# Patient Record
Sex: FEMALE | Race: ASIAN | NOT HISPANIC OR LATINO | URBAN - METROPOLITAN AREA
[De-identification: names, ages, dates, MRNs, and addresses within clinical notes are randomized per-mention and may not be internally consistent; named-entity substitution may affect disease eponyms.]

---

## 2019-01-12 ENCOUNTER — EMERGENCY (EMERGENCY)
Facility: HOSPITAL | Age: 29
LOS: 1 days | Discharge: HOME | End: 2019-01-12
Attending: EMERGENCY MEDICINE
Payer: MEDICAID

## 2019-01-12 VITALS
HEIGHT: 63 IN | WEIGHT: 123.9 LBS | OXYGEN SATURATION: 100 % | TEMPERATURE: 98 F | DIASTOLIC BLOOD PRESSURE: 77 MMHG | SYSTOLIC BLOOD PRESSURE: 136 MMHG | HEART RATE: 82 BPM | RESPIRATION RATE: 17 BRPM

## 2019-01-12 PROCEDURE — 99282 EMERGENCY DEPT VISIT SF MDM: CPT

## 2019-01-12 PROCEDURE — 99282 EMERGENCY DEPT VISIT SF MDM: CPT | Mod: 25

## 2019-01-12 NOTE — ED PROVIDER NOTE - CARE PLAN
Principal Discharge DX:	General medical examination  Goal:	Bilateral klein discoloration Principal Discharge DX:	Skin change  Goal:	Bilateral klein discoloration

## 2019-01-12 NOTE — ED PROVIDER NOTE - OBJECTIVE STATEMENT
29 yo  female 18 weeks pregnant, p/w b/l staining of her palms when she woke up this AM. Pt reports she was making Jerky with her  last night. 27 yo  female 18 weeks pregnant, p/w brown discoloration of her b/l palms when she woke up this AM. Pt reports she was making Jerky with her  last night and states that is a possible source of skin discoloration of her palms. Discoloration does not extend past klein wrist, spares soles, no rash or discoloration in skin anywhere else. Pt's 's palms only mildly discolored. Pt denies new diet, skin changes anywhere else, CP, SOB, abdominal pain, urinary symptoms, nausea, vomiting, diarrhea.

## 2019-01-12 NOTE — ED PROVIDER NOTE - PHYSICAL EXAMINATION
General: Patient awake alert NAD.   HEENT: normocephalic, atraumatic, EMOI, neck supple, no JVD  Cardiac: RRR, S1, S2, no murmur, rubs, gallop.   LUNGS: CTA B/L no wheeze, rhonchi, rales.   Abdomen: gravid abdomen, soft NT, ND, BS, no rebound no guarding, no CVA tenderness.   EXT: strength and ROM at patient's baseline, no edema, no calf tenderness,   Neuro: A&Ox3 gait normal, no focal neurological deficits, CN 2-12 grossly intact  Skin: + brown discoloration in bilateral palms, no discoloration in bilateral soles, warm, dry, no rash, no lesions

## 2019-01-12 NOTE — ED PROVIDER NOTE - ATTENDING CONTRIBUTION TO CARE
Attending MD Arredondo: I personally have seen and examined this patient.  Resident note reviewed and agree on plan of care and except where noted.  See below for details.     Seen in RH 0.5    28F with PMH including anemia presents to the ED with palmar discoloration.  Reports yesterday she and her  were handling seasoned red meat (seasoned with red chili) and today she has bilateral palmar discoloration.   has similar discoloration on dominant (right) hand.  Denies new environmental exposure.  Denies pruritic.  Denies other rash.  Denies chest pain, shortness of breath, palpitations. Denies abdominal pain, nausea, vomiting, diarrhea, blood in stools. Denies dysuria, hematuria, change in urinary habits including frequency, urgency. Denies fevers, chills.  Denies recent travel.  LMP 18, PRIYANKA 19wk0d by dates.  , spontaneous  in May 2018.  Reports that is the reason that they came in because concerned after miscarriage.  On exam, NAD, head NCAT, PERRL, FROM at neck, no tenderness to midline palpation, no stepoffs along length of spine, lungs CTAB with good inspiratory effort, +S1S2, no m/r/g, abdomen soft with +BS, NT, gravid abdomen, no CVAT, moving all extremities with 5/5 strength bilateral upper and lower extremities, good and equal  strength bilaterally, no rash noted, +yellowish discoloration to palmar aspect only of hands (fingers with clear demarcation stained/nonstained), same on  able to remove some yellow pigment from hands of both patient and , no rash elsewhere, soles clear; A/P: 28F with palmar discoloration, suspect contact.  Reassured.  Stable for discharge. Follow up instructions given, importance of follow up emphasized, return to ED parameters reviewed and patient verbalized understanding.  All questions answered, all concerns addressed.

## 2019-01-12 NOTE — ED PROVIDER NOTE - NS ED ROS FT
GENERAL: No fever, chills  EYES: no vision changes, no discharge.   HEENT: no difficulty  swallowing or speaking   CARDIAC: no chest pain/pressure, SOB, lower ex edema  PULMONARY: no cough, SOB  GI: no abdominal pain, n/v/d/c  : no dysuria, frequency, hematuria  SKIN: + brown discoloration of b/l palms. no rashes, lesions, vesicles anywhere else.   NEURO: no headache, lightheadedness, paraesthesias.   MSK: No joint pain, myalgia, weakness.

## 2019-01-12 NOTE — ED ADULT TRIAGE NOTE - CHIEF COMPLAINT QUOTE
Patient c/o bilateral hand palms yellowish since this am. Patient is 18 weeks pregnant. History of anemia.

## 2019-01-12 NOTE — ED ADULT NURSE NOTE - OBJECTIVE STATEMENT
27 y/o female a+ox3,  currently 18 weeks pregnant, denies pmhx, coming from home for "yellow hands" bilaterally x 1 day. Pt noticed the palms of her hands "looking yellow" bilaterally earlier today; pt felt slight right sided intermittent abdominal pain; currently resolved.  states they were "hanging meat to make jerky" within last 24 hours. Pt denies being high risk pregnancy, chest pain or discomfort, headache, lightheadedness, n/v/d, melena, hematuria, fevers or chills. Pt states abdominal pain is "normal"; denies recent travel or sick contacts. Pt left in position of comfort, will reassess

## 2019-01-13 VITALS
DIASTOLIC BLOOD PRESSURE: 66 MMHG | RESPIRATION RATE: 18 BRPM | HEART RATE: 70 BPM | OXYGEN SATURATION: 99 % | TEMPERATURE: 98 F | SYSTOLIC BLOOD PRESSURE: 101 MMHG

## 2019-04-14 ENCOUNTER — TRANSCRIPTION ENCOUNTER (OUTPATIENT)
Age: 29
End: 2019-04-14

## 2020-02-24 ENCOUNTER — OUTPATIENT (OUTPATIENT)
Dept: INPATIENT UNIT | Facility: HOSPITAL | Age: 30
LOS: 1 days | Discharge: HOME | End: 2020-02-24
Payer: MEDICAID

## 2020-02-24 ENCOUNTER — EMERGENCY (EMERGENCY)
Facility: HOSPITAL | Age: 30
LOS: 1 days | Discharge: STILL ADMITTED OR ADMINISTRATIVE | End: 2020-02-24
Admitting: EMERGENCY MEDICINE

## 2020-02-24 VITALS
TEMPERATURE: 98 F | RESPIRATION RATE: 16 BRPM | OXYGEN SATURATION: 100 % | DIASTOLIC BLOOD PRESSURE: 70 MMHG | HEART RATE: 78 BPM | SYSTOLIC BLOOD PRESSURE: 106 MMHG

## 2020-02-24 DIAGNOSIS — Z3A.00 WEEKS OF GESTATION OF PREGNANCY NOT SPECIFIED: ICD-10-CM

## 2020-02-24 DIAGNOSIS — O26.899 OTHER SPECIFIED PREGNANCY RELATED CONDITIONS, UNSPECIFIED TRIMESTER: ICD-10-CM

## 2020-02-24 PROCEDURE — 76830 TRANSVAGINAL US NON-OB: CPT | Mod: 26

## 2020-02-24 PROCEDURE — 76818 FETAL BIOPHYS PROFILE W/NST: CPT | Mod: 26

## 2020-02-24 PROCEDURE — 99215 OFFICE O/P EST HI 40 MIN: CPT

## 2020-02-24 NOTE — ED ADULT NURSE NOTE - CHIEF COMPLAINT QUOTE
c/o episode of vaginal bleeding after intercourse. also intermittent pelvic cramping. approximately 25 weeks pregnant. normal pregnancy so far. due date 6/7. unsure if still bleeding, states believes bleeding has stopped.

## 2020-02-24 NOTE — ED ADULT TRIAGE NOTE - CHIEF COMPLAINT QUOTE
c/o episode of vaginal bleeding after intercourse. also intermittent pelvic cramping. approximately 25 weeks pregnant. normal pregnancy so far. due date 6/7. unsure if still bleeding. c/o episode of vaginal bleeding after intercourse. also intermittent pelvic cramping. approximately 25 weeks pregnant. normal pregnancy so far. due date 6/7. unsure if still bleeding, states believes bleeding has stopped.

## 2020-02-25 VITALS
SYSTOLIC BLOOD PRESSURE: 107 MMHG | DIASTOLIC BLOOD PRESSURE: 54 MMHG | TEMPERATURE: 98 F | RESPIRATION RATE: 16 BRPM | HEART RATE: 79 BPM

## 2020-02-25 VITALS — DIASTOLIC BLOOD PRESSURE: 51 MMHG | SYSTOLIC BLOOD PRESSURE: 94 MMHG | HEART RATE: 72 BPM

## 2020-02-25 LAB
APPEARANCE UR: CLEAR — SIGNIFICANT CHANGE UP
BACTERIA # UR AUTO: NEGATIVE — SIGNIFICANT CHANGE UP
BILIRUB UR-MCNC: NEGATIVE — SIGNIFICANT CHANGE UP
BLD GP AB SCN SERPL QL: NEGATIVE — SIGNIFICANT CHANGE UP
BLOOD UR QL VISUAL: SIGNIFICANT CHANGE UP
COLOR SPEC: SIGNIFICANT CHANGE UP
FIBRINOGEN PPP-MCNC: 560 MG/DL — HIGH (ref 350–510)
GLUCOSE UR-MCNC: NEGATIVE — SIGNIFICANT CHANGE UP
HCT VFR BLD CALC: 24.1 % — LOW (ref 34.5–45)
HGB BLD-MCNC: 7.5 G/DL — LOW (ref 11.5–15.5)
HYALINE CASTS # UR AUTO: NEGATIVE — SIGNIFICANT CHANGE UP
KETONES UR-MCNC: NEGATIVE — SIGNIFICANT CHANGE UP
LEUKOCYTE ESTERASE UR-ACNC: NEGATIVE — SIGNIFICANT CHANGE UP
MCHC RBC-ENTMCNC: 22.1 PG — LOW (ref 27–34)
MCHC RBC-ENTMCNC: 31.1 % — LOW (ref 32–36)
MCV RBC AUTO: 70.9 FL — LOW (ref 80–100)
NITRITE UR-MCNC: NEGATIVE — SIGNIFICANT CHANGE UP
NRBC # FLD: 0.05 K/UL — SIGNIFICANT CHANGE UP (ref 0–0)
PH UR: 7 — SIGNIFICANT CHANGE UP (ref 5–8)
PLATELET # BLD AUTO: 359 K/UL — SIGNIFICANT CHANGE UP (ref 150–400)
PMV BLD: 10.5 FL — SIGNIFICANT CHANGE UP (ref 7–13)
PROT UR-MCNC: NEGATIVE — SIGNIFICANT CHANGE UP
RBC # BLD: 3.4 M/UL — LOW (ref 3.8–5.2)
RBC # FLD: 18.4 % — HIGH (ref 10.3–14.5)
RBC CASTS # UR COMP ASSIST: SIGNIFICANT CHANGE UP (ref 0–?)
RH IG SCN BLD-IMP: POSITIVE — SIGNIFICANT CHANGE UP
SP GR SPEC: 1.01 — SIGNIFICANT CHANGE UP (ref 1–1.04)
SQUAMOUS # UR AUTO: SIGNIFICANT CHANGE UP
UROBILINOGEN FLD QL: NORMAL — SIGNIFICANT CHANGE UP
WBC # BLD: 10.2 K/UL — SIGNIFICANT CHANGE UP (ref 3.8–10.5)
WBC # FLD AUTO: 10.2 K/UL — SIGNIFICANT CHANGE UP (ref 3.8–10.5)
WBC UR QL: SIGNIFICANT CHANGE UP (ref 0–?)

## 2020-02-25 NOTE — OB PROVIDER TRIAGE NOTE - NSHPPHYSICALEXAM_GEN_ALL_CORE
BP: 107/54, HR: 79, Temp: 36.9  Abdomen soft nontender  TAS: Cephalic presentation, posterior placenta, MVP:7.72, BPP:8/8  Speulum: Cervix appears closed. Small amount of streaks of blood noted at cervical os but no active bleeding  TVS: CL:3.4-3.87 no funneling or dynamic changes

## 2020-02-25 NOTE — OB RN TRIAGE NOTE - PMH
Blood transfusion affecting pregnancy  H/o blood transfusion in first pregnancy due to severe anemia  Miscarriage  X2 with no D&C   (normal spontaneous vaginal delivery)  2007 FT M 7#8  2019 FT M 6#5

## 2020-02-25 NOTE — OB PROVIDER TRIAGE NOTE - NSOBPROVIDERNOTE_OBGYN_ALL_OB_FT
Pt. is a 30y/o  EGA 25.2wks receives prenatal care from a private clinic in [place] associated with [facility]. Pt. states after having intercourse she noticed bright red blood on her inner thighs, perineum and partner's pelvic area . Pt. also reports of some mild abdominal cramping after intercourse. Pt. denies leakage of fluid. Pt. reports good fetal movement.     AP: Denies  Medical Hx: Received 1unit PRBC in  @8months pregnant for severe anemia  Surgical Hx: Denies  OBGYN Hx:    2010 7-8   2019 6-5  SABx2  and   Meds: PNV, Iron  NKDA    Assessment/Plan  BP: 107/54, HR: 79, Temp: 36.9  Abdomen soft nontender  TAS: Cephalic presentation, posterior placenta, MVP:7.72, BPP:8/8  Speulum: Cervix appears closed. Small amount of streaks of blood noted at cervical os but no active bleeding  TVS: CL: 3.4-3.87 no funneling or dynamic changes Pt. is a 28y/o  EGA 25.2wks receives prenatal care from a private clinic in [place] associated with [facility]. Pt. states after having intercourse she noticed bright red blood on her inner thighs, perineum and partner's pelvic area . Pt. also reports of some mild abdominal cramping after intercourse. Pt. denies leakage of fluid. Pt. reports good fetal movement.     AP: Denies  Blood Type: B Pos.                                          Medical Hx: Received 1unit PRBC in  @8months pregnant for severe anemia  Surgical Hx: Denies  OBGYN Hx:    2010 7-8   2019 6-5  SABx2  and   Meds: PNV, Iron  NKDA    Assessment/Plan  BP: 107/54, HR: 79, Temp: 36.9  Abdomen soft nontender  TAS: Cephalic presentation, posterior placenta, MVP:7.72, BPP:8/8  Speulum: Cervix appears closed. Small amount of streaks of blood noted at cervical os but no active bleeding  TVS: CL: 3.4-3.87 no funneling or dynamic changes                          7.5    10.20 )-----------( 359      ( 2020 00:30 )             24.1     Fibrinogen: 560    Urinalysis Basic - ( 2020 00:30 )    Color: LIGHT YELLOW / Appearance: CLEAR / S.007 / pH: 7.0  Gluc: NEGATIVE / Ketone: NEGATIVE  / Bili: NEGATIVE / Urobili: NORMAL   Blood: SMALL / Protein: NEGATIVE / Nitrite: NEGATIVE   Leuk Esterase: NEGATIVE / RBC: 3-5 / WBC 3-5   Sq Epi: OCC / Non Sq Epi: x / Bacteria: NEGATIVE Pt. is a 30y/o  EGA 25.2wks receives prenatal care from a private clinic in [place] associated with [facility]. Pt. states after having intercourse she noticed bright red blood on her inner thighs, perineum and partner's pelvic area . Pt. also reports of some mild abdominal cramping after intercourse. Pt. denies leakage of fluid. Pt. reports good fetal movement.     AP: Denies  Blood Type: B Pos.                                          Medical Hx: Received 1unit PRBC in  @8months pregnant for severe anemia  Surgical Hx: Denies  OBGYN Hx:    2010 7-8   2019 6-5  SABx2  and   Meds: PNV, Iron  NKDA    Assessment/Plan  BP: 107/54, HR: 79, Temp: 36.9  Abdomen soft nontender  TAS: Cephalic presentation, posterior placenta, MVP:7.72, BPP:8/8  Speulum: Cervix appears closed. Small amount of streaks of blood noted at cervical os but no active bleeding  TVS: CL: 3.4-3.87 no funneling or dynamic changes  FHR: 140bpm, moderate variability, accelerations, no decelerations  Satish irregularly                         7.5    10.20 )-----------( 359      ( 2020 00:30 )             24.1     Fibrinogen: 560    Urinalysis Basic - ( 2020 00:30 )    Color: LIGHT YELLOW / Appearance: CLEAR / S.007 / pH: 7.0  Gluc: NEGATIVE / Ketone: NEGATIVE  / Bili: NEGATIVE / Urobili: NORMAL   Blood: SMALL / Protein: NEGATIVE / Nitrite: NEGATIVE   Leuk Esterase: NEGATIVE / RBC: 3-5 / WBC 3-5   Sq Epi: OCC / Non Sq Epi: x / Bacteria: NEGATIVE Pt. is a 28y/o  EGA 25.2wks receives prenatal care from a private clinic in [place] associated with [facility]. Pt. states after having intercourse she noticed bright red blood on her inner thighs, perineum and partner's pelvic area . Pt. also reports of some mild abdominal cramping after intercourse. Pt. denies leakage of fluid. Pt. reports good fetal movement.     AP: Denies  Blood Type: B Pos.                                          Medical Hx: Received 1unit PRBC in  @8months pregnant for severe anemia  Surgical Hx: Denies  OBGYN Hx:    2010 7-8   2019 6-5  SABx2  and   Meds: PNV, Iron  NKDA    Assessment/Plan  BP: 107/54, HR: 79, Temp: 36.9  Abdomen soft nontender  TAS: Cephalic presentation, posterior placenta, MVP:7.72, BPP:8/8  Speulum: Cervix appears closed. Small amount of streaks of blood noted at cervical os but no active bleeding  TVS: CL: 3.4-3.87 no funneling or dynamic changes  FHR: 140bpm, moderate variability, accelerations, no decelerations  Satish irregularly                         7.5    10.20 )-----------( 359      ( 2020 00:30 )             24.1     Fibrinogen: 560    Urinalysis Basic - ( 2020 00:30 )    Color: LIGHT YELLOW / Appearance: CLEAR / S.007 / pH: 7.0  Gluc: NEGATIVE / Ketone: NEGATIVE  / Bili: NEGATIVE / Urobili: NORMAL   Blood: SMALL / Protein: NEGATIVE / Nitrite: NEGATIVE   Leuk Esterase: NEGATIVE / RBC: 3-5 / WBC 3-5   Sq Epi: OCC / Non Sq Epi: x / Bacteria: NEGATIVE    Fetal wellbeing reassured. Discussed findings with Dr. Mendez. Pt. d/c'd home. Pt. to follow up with next OB appointment. Pt. instructed to return to triage with increase abdominal contractions, leakage of fluid, vaginal bleeding or decrease fetal movement

## 2020-02-25 NOTE — OB PROVIDER TRIAGE NOTE - NSHPLABSRESULTS_GEN_ALL_CORE
7.5    10.20 )-----------( 359      ( 2020 00:30 )             24.1     Fibrinogen: 560    Urinalysis Basic - ( 2020 00:30 )    Color: LIGHT YELLOW / Appearance: CLEAR / S.007 / pH: 7.0  Gluc: NEGATIVE / Ketone: NEGATIVE  / Bili: NEGATIVE / Urobili: NORMAL   Blood: SMALL / Protein: NEGATIVE / Nitrite: NEGATIVE   Leuk Esterase: NEGATIVE / RBC: 3-5 / WBC 3-5   Sq Epi: OCC / Non Sq Epi: x / Bacteria: NEGATIVE

## 2020-02-25 NOTE — OB PROVIDER TRIAGE NOTE - HISTORY OF PRESENT ILLNESS
Pt. is a 28y/o  EGA 25.2wks receives prenatal care from a private clinic in [place] associated with [facility]. Pt. states after having intercourse she noticed bright red blood on her inner thighs, perineum and partner's pelvic area . Pt. also reports of some mild abdominal cramping after intercourse. Pt. denies leakage of fluid. Pt. reports good fetal movement.

## 2020-09-20 ENCOUNTER — EMERGENCY (EMERGENCY)
Facility: HOSPITAL | Age: 30
LOS: 1 days | Discharge: HOME | End: 2020-09-20
Attending: EMERGENCY MEDICINE | Admitting: EMERGENCY MEDICINE
Payer: MEDICAID

## 2020-09-20 VITALS
TEMPERATURE: 98 F | OXYGEN SATURATION: 99 % | RESPIRATION RATE: 16 BRPM | SYSTOLIC BLOOD PRESSURE: 117 MMHG | HEART RATE: 89 BPM | DIASTOLIC BLOOD PRESSURE: 70 MMHG | HEIGHT: 63 IN

## 2020-09-20 PROBLEM — O99.89 OTHER SPECIFIED DISEASES AND CONDITIONS COMPLICATING PREGNANCY, CHILDBIRTH AND THE PUERPERIUM: Chronic | Status: ACTIVE | Noted: 2020-02-25

## 2020-09-20 PROBLEM — O03.9 COMPLETE OR UNSPECIFIED SPONTANEOUS ABORTION WITHOUT COMPLICATION: Chronic | Status: ACTIVE | Noted: 2020-02-25

## 2020-09-20 PROCEDURE — 99284 EMERGENCY DEPT VISIT MOD MDM: CPT

## 2020-09-20 RX ORDER — AMOXICILLIN 250 MG/5ML
875 SUSPENSION, RECONSTITUTED, ORAL (ML) ORAL ONCE
Refills: 0 | Status: DISCONTINUED | OUTPATIENT
Start: 2020-09-20 | End: 2020-09-20

## 2020-09-20 RX ORDER — METOCLOPRAMIDE HCL 10 MG
10 TABLET ORAL ONCE
Refills: 0 | Status: COMPLETED | OUTPATIENT
Start: 2020-09-20 | End: 2020-09-20

## 2020-09-20 RX ORDER — KETOROLAC TROMETHAMINE 30 MG/ML
30 SYRINGE (ML) INJECTION ONCE
Refills: 0 | Status: DISCONTINUED | OUTPATIENT
Start: 2020-09-20 | End: 2020-09-20

## 2020-09-20 RX ORDER — AMOXICILLIN 250 MG/5ML
500 SUSPENSION, RECONSTITUTED, ORAL (ML) ORAL ONCE
Refills: 0 | Status: COMPLETED | OUTPATIENT
Start: 2020-09-20 | End: 2020-09-20

## 2020-09-20 RX ORDER — SODIUM CHLORIDE 9 MG/ML
1000 INJECTION INTRAMUSCULAR; INTRAVENOUS; SUBCUTANEOUS ONCE
Refills: 0 | Status: COMPLETED | OUTPATIENT
Start: 2020-09-20 | End: 2020-09-20

## 2020-09-20 RX ORDER — AMOXICILLIN 250 MG/5ML
1 SUSPENSION, RECONSTITUTED, ORAL (ML) ORAL
Qty: 20 | Refills: 0
Start: 2020-09-20 | End: 2020-09-29

## 2020-09-20 RX ORDER — AZTREONAM 2 G
1 VIAL (EA) INJECTION
Qty: 20 | Refills: 0
Start: 2020-09-20 | End: 2020-09-29

## 2020-09-20 RX ADMIN — Medication 10 MILLIGRAM(S): at 10:19

## 2020-09-20 RX ADMIN — Medication 500 MILLIGRAM(S): at 10:50

## 2020-09-20 RX ADMIN — Medication 1 TABLET(S): at 10:37

## 2020-09-20 RX ADMIN — SODIUM CHLORIDE 1000 MILLILITER(S): 9 INJECTION INTRAMUSCULAR; INTRAVENOUS; SUBCUTANEOUS at 10:19

## 2020-09-20 RX ADMIN — Medication 30 MILLIGRAM(S): at 10:19

## 2020-09-20 NOTE — ED PROVIDER NOTE - PROGRESS NOTE DETAILS
started pt on fluids and pain control. will reassess for sxs improvement Pt states her sxs have improved. explained that sxs can be due to septal cellulitis or possible migraine. pt explained to take abx as directed. will return if she has worsening fever, pain, vomiting, rash or other eye symptoms.

## 2020-09-20 NOTE — ED PROVIDER NOTE - PATIENT PORTAL LINK FT
You can access the FollowMyHealth Patient Portal offered by [facility] by registering at the following website: [email or web address]. By joining [employer]’s FollowMyHealth portal, you will also be able to view your health information using other applications (apps) compatible with our system.

## 2020-09-20 NOTE — ED PROVIDER NOTE - PHYSICAL EXAMINATION
Vital signs reviewed  GENERAL: Patient nontoxic appearing, NAD. ambulating   HEAD: NCAT  EYES: Anicteric. EOMI, PERRLA. left eyelid and periorbital swelling   ENT: MMM  NECK: Supple, non tender  RESPIRATORY: Normal respiratory effort. CTA B/L. No wheezing, rales, rhonchi  CARDIOVASCULAR: Regular rate and rhythm  ABDOMEN: Soft. Nondistended. Nontender. No guarding or rebound. No CVA tenderness.  MUSCULOSKELETAL/EXTREMITIES: Brisk cap refill. 2+ radial pulses. No leg edema.  SKIN:  Warm and dry  NEURO: AAOx3. No gross FND.  PSYCHIATRIC: Cooperative. Affect appropriate.

## 2020-09-20 NOTE — ED ADULT NURSE NOTE - OBJECTIVE STATEMENT
PT. is A&Ox3 presents to ER c/o left eye pain and swelling. Pt. states pain to left eye pain and headache began Friday when eating dinner. Pt. denies loss of vision, denies dizziness, CP, SOB, fever, and chills. Noted swelling to upper and lower left eye lid. Pt. states "it feels like pressure behind my eye". 20G IV placed in RAC, flushing without resistance, dressing dry and intact. Medicated as ordered. Safety precautions maintained.

## 2020-09-20 NOTE — ED PROVIDER NOTE - CLINICAL SUMMARY MEDICAL DECISION MAKING FREE TEXT BOX
30 y/o F no PMhx p/w left eye pain and left nostril x 2 days. vitals wnl, on exam she is tearing from left eye with periorbital swelling,. dx likely complex migraine vs cluster vs septal cellulitis. will treat with pain meds and abx and have f/u with PMD

## 2020-09-20 NOTE — ED PROVIDER NOTE - OBJECTIVE STATEMENT
28 y/o F no PMhx p/w left eye pain x 2 days. pt states she was eating dinner when she suddenly felt the pain travel from left nostril to surrounding left eye. she felt nauseous and vomited once, nbnb. has not had similar sxs in the past. she has mild sensitivity to light with clear nasal discharge and eye tearing. able to tolerate fluids PO. pain currently 6/10, sharp and non radiating.     pt denies any fever, chills, dizziness, cp, palpitations, cough, sob, wheezing, abdominal pain, vision changes, numbness/tingling/weakness, trouble walking,

## 2020-09-20 NOTE — ED PROVIDER NOTE - ATTENDING CONTRIBUTION TO CARE
29 year old female with left facial pain. concern for preseptal cellulitis. orbital cellulitis unlikley given no pain on eye movement.  abx and optho fu

## 2020-09-20 NOTE — ED PROVIDER NOTE - NS ED ROS FT
Constitutional: No fever, chills.  Eyes:  No visual changes + eye pain   ENMT:  No neck pain  Cardiac:  No chest pain  Respiratory:  No cough, SOB  GI:  + nausea + vomiting   :  No dysuria, hematuria  MS:  No back pain.  Neuro:  No headache or lightheadedness  Skin:  No skin rash  Endocrine: No history of thyroid disease or diabetes.  Except as documented in the HPI,  all other systems are negative.

## 2021-01-19 PROBLEM — Z00.00 ENCOUNTER FOR PREVENTIVE HEALTH EXAMINATION: Status: ACTIVE | Noted: 2021-01-19

## 2021-02-04 ENCOUNTER — APPOINTMENT (OUTPATIENT)
Dept: ORTHOPEDIC SURGERY | Facility: CLINIC | Age: 31
End: 2021-02-04
Payer: MEDICAID

## 2021-02-04 VITALS — WEIGHT: 150 LBS | BODY MASS INDEX: 25.61 KG/M2 | RESPIRATION RATE: 16 BRPM | HEIGHT: 64 IN

## 2021-02-04 DIAGNOSIS — Z78.9 OTHER SPECIFIED HEALTH STATUS: ICD-10-CM

## 2021-02-04 DIAGNOSIS — M79.641 PAIN IN RIGHT HAND: ICD-10-CM

## 2021-02-04 DIAGNOSIS — M79.642 PAIN IN RIGHT HAND: ICD-10-CM

## 2021-02-04 PROCEDURE — 73130 X-RAY EXAM OF HAND: CPT | Mod: LT

## 2021-02-04 PROCEDURE — 99072 ADDL SUPL MATRL&STAF TM PHE: CPT

## 2021-02-04 PROCEDURE — 99204 OFFICE O/P NEW MOD 45 MIN: CPT

## 2021-03-18 ENCOUNTER — APPOINTMENT (OUTPATIENT)
Dept: ORTHOPEDIC SURGERY | Facility: CLINIC | Age: 31
End: 2021-03-18

## 2022-04-25 ENCOUNTER — TRANSCRIPTION ENCOUNTER (OUTPATIENT)
Age: 32
End: 2022-04-25

## 2022-06-08 ENCOUNTER — TRANSCRIPTION ENCOUNTER (OUTPATIENT)
Age: 32
End: 2022-06-08

## 2022-06-08 ENCOUNTER — INPATIENT (INPATIENT)
Facility: HOSPITAL | Age: 32
LOS: 0 days | Discharge: HOME | End: 2022-06-09
Attending: SURGERY | Admitting: SURGERY
Payer: MEDICAID

## 2022-06-08 VITALS
TEMPERATURE: 97 F | DIASTOLIC BLOOD PRESSURE: 49 MMHG | OXYGEN SATURATION: 98 % | SYSTOLIC BLOOD PRESSURE: 97 MMHG | HEART RATE: 75 BPM | HEIGHT: 63 IN | RESPIRATION RATE: 16 BRPM

## 2022-06-08 DIAGNOSIS — K81.9 CHOLECYSTITIS, UNSPECIFIED: ICD-10-CM

## 2022-06-08 LAB
ALBUMIN SERPL ELPH-MCNC: 4.6 G/DL — SIGNIFICANT CHANGE UP (ref 3.3–5)
ALP SERPL-CCNC: 56 U/L — SIGNIFICANT CHANGE UP (ref 40–120)
ALT FLD-CCNC: 10 U/L — SIGNIFICANT CHANGE UP (ref 4–33)
ANION GAP SERPL CALC-SCNC: 9 MMOL/L — SIGNIFICANT CHANGE UP (ref 7–14)
ANISOCYTOSIS BLD QL: SLIGHT — SIGNIFICANT CHANGE UP
APPEARANCE UR: ABNORMAL
APTT BLD: 29.6 SEC — SIGNIFICANT CHANGE UP (ref 27–36.3)
AST SERPL-CCNC: 11 U/L — SIGNIFICANT CHANGE UP (ref 4–32)
BASOPHILS # BLD AUTO: 0.1 K/UL — SIGNIFICANT CHANGE UP (ref 0–0.2)
BASOPHILS NFR BLD AUTO: 0.9 % — SIGNIFICANT CHANGE UP (ref 0–2)
BILIRUB SERPL-MCNC: 0.8 MG/DL — SIGNIFICANT CHANGE UP (ref 0.2–1.2)
BILIRUB UR-MCNC: NEGATIVE — SIGNIFICANT CHANGE UP
BLD GP AB SCN SERPL QL: NEGATIVE — SIGNIFICANT CHANGE UP
BUN SERPL-MCNC: 20 MG/DL — SIGNIFICANT CHANGE UP (ref 7–23)
CALCIUM SERPL-MCNC: 9.7 MG/DL — SIGNIFICANT CHANGE UP (ref 8.4–10.5)
CHLORIDE SERPL-SCNC: 101 MMOL/L — SIGNIFICANT CHANGE UP (ref 98–107)
CO2 SERPL-SCNC: 26 MMOL/L — SIGNIFICANT CHANGE UP (ref 22–31)
COLOR SPEC: YELLOW — SIGNIFICANT CHANGE UP
CREAT SERPL-MCNC: 0.72 MG/DL — SIGNIFICANT CHANGE UP (ref 0.5–1.3)
DIFF PNL FLD: NEGATIVE — SIGNIFICANT CHANGE UP
EGFR: 114 ML/MIN/1.73M2 — SIGNIFICANT CHANGE UP
EOSINOPHIL # BLD AUTO: 0 K/UL — SIGNIFICANT CHANGE UP (ref 0–0.5)
EOSINOPHIL NFR BLD AUTO: 0 % — SIGNIFICANT CHANGE UP (ref 0–6)
EPI CELLS # UR: SIGNIFICANT CHANGE UP
GIANT PLATELETS BLD QL SMEAR: PRESENT — SIGNIFICANT CHANGE UP
GLUCOSE SERPL-MCNC: 125 MG/DL — HIGH (ref 70–99)
GLUCOSE UR QL: NEGATIVE — SIGNIFICANT CHANGE UP
HCT VFR BLD CALC: 34.2 % — LOW (ref 34.5–45)
HGB BLD-MCNC: 10.5 G/DL — LOW (ref 11.5–15.5)
IANC: 9.27 K/UL — HIGH (ref 1.8–7.4)
INR BLD: 1.12 RATIO — SIGNIFICANT CHANGE UP (ref 0.88–1.16)
KETONES UR-MCNC: NEGATIVE — SIGNIFICANT CHANGE UP
LEUKOCYTE ESTERASE UR-ACNC: NEGATIVE — SIGNIFICANT CHANGE UP
LIDOCAIN IGE QN: 34 U/L — SIGNIFICANT CHANGE UP (ref 7–60)
LYMPHOCYTES # BLD AUTO: 1.57 K/UL — SIGNIFICANT CHANGE UP (ref 1–3.3)
LYMPHOCYTES # BLD AUTO: 14 % — SIGNIFICANT CHANGE UP (ref 13–44)
MANUAL SMEAR VERIFICATION: SIGNIFICANT CHANGE UP
MCHC RBC-ENTMCNC: 20.2 PG — LOW (ref 27–34)
MCHC RBC-ENTMCNC: 30.7 GM/DL — LOW (ref 32–36)
MCV RBC AUTO: 65.6 FL — LOW (ref 80–100)
MONOCYTES # BLD AUTO: 0.2 K/UL — SIGNIFICANT CHANGE UP (ref 0–0.9)
MONOCYTES NFR BLD AUTO: 1.8 % — LOW (ref 2–14)
NEUTROPHILS # BLD AUTO: 8.82 K/UL — HIGH (ref 1.8–7.4)
NEUTROPHILS NFR BLD AUTO: 78.9 % — HIGH (ref 43–77)
NITRITE UR-MCNC: NEGATIVE — SIGNIFICANT CHANGE UP
OVALOCYTES BLD QL SMEAR: SLIGHT — SIGNIFICANT CHANGE UP
PH UR: 7 — SIGNIFICANT CHANGE UP (ref 5–8)
PLAT MORPH BLD: NORMAL — SIGNIFICANT CHANGE UP
PLATELET # BLD AUTO: 299 K/UL — SIGNIFICANT CHANGE UP (ref 150–400)
PLATELET COUNT - ESTIMATE: NORMAL — SIGNIFICANT CHANGE UP
POIKILOCYTOSIS BLD QL AUTO: SLIGHT — SIGNIFICANT CHANGE UP
POLYCHROMASIA BLD QL SMEAR: SLIGHT — SIGNIFICANT CHANGE UP
POTASSIUM SERPL-MCNC: 4.2 MMOL/L — SIGNIFICANT CHANGE UP (ref 3.5–5.3)
POTASSIUM SERPL-SCNC: 4.2 MMOL/L — SIGNIFICANT CHANGE UP (ref 3.5–5.3)
PROT SERPL-MCNC: 7.6 G/DL — SIGNIFICANT CHANGE UP (ref 6–8.3)
PROT UR-MCNC: 20 — SIGNIFICANT CHANGE UP
PROTHROM AB SERPL-ACNC: 13 SEC — SIGNIFICANT CHANGE UP (ref 10.5–13.4)
RBC # BLD: 5.21 M/UL — HIGH (ref 3.8–5.2)
RBC # FLD: 15.3 % — HIGH (ref 10.3–14.5)
RBC BLD AUTO: ABNORMAL
RH IG SCN BLD-IMP: POSITIVE — SIGNIFICANT CHANGE UP
SARS-COV-2 RNA SPEC QL NAA+PROBE: SIGNIFICANT CHANGE UP
SODIUM SERPL-SCNC: 136 MMOL/L — SIGNIFICANT CHANGE UP (ref 135–145)
SP GR SPEC: 1.03 — SIGNIFICANT CHANGE UP (ref 1–1.05)
UROBILINOGEN FLD QL: SIGNIFICANT CHANGE UP
VARIANT LYMPHS # BLD: 4.4 % — SIGNIFICANT CHANGE UP (ref 0–6)
WBC # BLD: 11.18 K/UL — HIGH (ref 3.8–10.5)
WBC # FLD AUTO: 11.18 K/UL — HIGH (ref 3.8–10.5)

## 2022-06-08 PROCEDURE — 99222 1ST HOSP IP/OBS MODERATE 55: CPT | Mod: 57

## 2022-06-08 PROCEDURE — 76705 ECHO EXAM OF ABDOMEN: CPT | Mod: 26

## 2022-06-08 PROCEDURE — 99285 EMERGENCY DEPT VISIT HI MDM: CPT

## 2022-06-08 DEVICE — LIGATING CLIPS WECK HEMOLOK POLYMER MEDIUM-LARGE (GREEN) 6: Type: IMPLANTABLE DEVICE | Status: FUNCTIONAL

## 2022-06-08 RX ORDER — ACETAMINOPHEN 500 MG
975 TABLET ORAL EVERY 6 HOURS
Refills: 0 | Status: DISCONTINUED | OUTPATIENT
Start: 2022-06-08 | End: 2022-06-09

## 2022-06-08 RX ORDER — HYDROMORPHONE HYDROCHLORIDE 2 MG/ML
0.5 INJECTION INTRAMUSCULAR; INTRAVENOUS; SUBCUTANEOUS EVERY 4 HOURS
Refills: 0 | Status: DISCONTINUED | OUTPATIENT
Start: 2022-06-08 | End: 2022-06-09

## 2022-06-08 RX ORDER — SODIUM CHLORIDE 9 MG/ML
1000 INJECTION INTRAMUSCULAR; INTRAVENOUS; SUBCUTANEOUS
Refills: 0 | Status: DISCONTINUED | OUTPATIENT
Start: 2022-06-08 | End: 2022-06-09

## 2022-06-08 RX ORDER — ONDANSETRON 8 MG/1
4 TABLET, FILM COATED ORAL ONCE
Refills: 0 | Status: COMPLETED | OUTPATIENT
Start: 2022-06-08 | End: 2022-06-08

## 2022-06-08 RX ORDER — HYDROMORPHONE HYDROCHLORIDE 2 MG/ML
1 INJECTION INTRAMUSCULAR; INTRAVENOUS; SUBCUTANEOUS EVERY 4 HOURS
Refills: 0 | Status: DISCONTINUED | OUTPATIENT
Start: 2022-06-08 | End: 2022-06-09

## 2022-06-08 RX ORDER — PIPERACILLIN AND TAZOBACTAM 4; .5 G/20ML; G/20ML
3.38 INJECTION, POWDER, LYOPHILIZED, FOR SOLUTION INTRAVENOUS ONCE
Refills: 0 | Status: COMPLETED | OUTPATIENT
Start: 2022-06-08 | End: 2022-06-08

## 2022-06-08 RX ORDER — BENZOYL PEROXIDE MICRONIZED 5.8 %
1 TOWELETTE (EA) TOPICAL
Qty: 0 | Refills: 0 | DISCHARGE

## 2022-06-08 RX ORDER — FAMOTIDINE 10 MG/ML
20 INJECTION INTRAVENOUS ONCE
Refills: 0 | Status: COMPLETED | OUTPATIENT
Start: 2022-06-08 | End: 2022-06-08

## 2022-06-08 RX ORDER — SODIUM CHLORIDE 9 MG/ML
1000 INJECTION, SOLUTION INTRAVENOUS
Refills: 0 | Status: DISCONTINUED | OUTPATIENT
Start: 2022-06-08 | End: 2022-06-08

## 2022-06-08 RX ORDER — PIPERACILLIN AND TAZOBACTAM 4; .5 G/20ML; G/20ML
3.38 INJECTION, POWDER, LYOPHILIZED, FOR SOLUTION INTRAVENOUS EVERY 8 HOURS
Refills: 0 | Status: DISCONTINUED | OUTPATIENT
Start: 2022-06-08 | End: 2022-06-09

## 2022-06-08 RX ORDER — ENOXAPARIN SODIUM 100 MG/ML
40 INJECTION SUBCUTANEOUS EVERY 24 HOURS
Refills: 0 | Status: DISCONTINUED | OUTPATIENT
Start: 2022-06-08 | End: 2022-06-09

## 2022-06-08 RX ORDER — SODIUM CHLORIDE 9 MG/ML
1000 INJECTION, SOLUTION INTRAVENOUS ONCE
Refills: 0 | Status: COMPLETED | OUTPATIENT
Start: 2022-06-08 | End: 2022-06-08

## 2022-06-08 RX ORDER — MORPHINE SULFATE 50 MG/1
4 CAPSULE, EXTENDED RELEASE ORAL ONCE
Refills: 0 | Status: DISCONTINUED | OUTPATIENT
Start: 2022-06-08 | End: 2022-06-08

## 2022-06-08 RX ADMIN — PIPERACILLIN AND TAZOBACTAM 25 GRAM(S): 4; .5 INJECTION, POWDER, LYOPHILIZED, FOR SOLUTION INTRAVENOUS at 17:16

## 2022-06-08 RX ADMIN — FAMOTIDINE 20 MILLIGRAM(S): 10 INJECTION INTRAVENOUS at 04:39

## 2022-06-08 RX ADMIN — HYDROMORPHONE HYDROCHLORIDE 1 MILLIGRAM(S): 2 INJECTION INTRAMUSCULAR; INTRAVENOUS; SUBCUTANEOUS at 10:17

## 2022-06-08 RX ADMIN — ONDANSETRON 4 MILLIGRAM(S): 8 TABLET, FILM COATED ORAL at 10:19

## 2022-06-08 RX ADMIN — MORPHINE SULFATE 4 MILLIGRAM(S): 50 CAPSULE, EXTENDED RELEASE ORAL at 08:01

## 2022-06-08 RX ADMIN — MORPHINE SULFATE 4 MILLIGRAM(S): 50 CAPSULE, EXTENDED RELEASE ORAL at 08:15

## 2022-06-08 RX ADMIN — PIPERACILLIN AND TAZOBACTAM 200 GRAM(S): 4; .5 INJECTION, POWDER, LYOPHILIZED, FOR SOLUTION INTRAVENOUS at 10:17

## 2022-06-08 RX ADMIN — SODIUM CHLORIDE 125 MILLILITER(S): 9 INJECTION, SOLUTION INTRAVENOUS at 10:12

## 2022-06-08 RX ADMIN — SODIUM CHLORIDE 1000 MILLILITER(S): 9 INJECTION, SOLUTION INTRAVENOUS at 08:00

## 2022-06-08 RX ADMIN — SODIUM CHLORIDE 125 MILLILITER(S): 9 INJECTION INTRAMUSCULAR; INTRAVENOUS; SUBCUTANEOUS at 17:15

## 2022-06-08 RX ADMIN — HYDROMORPHONE HYDROCHLORIDE 1 MILLIGRAM(S): 2 INJECTION INTRAMUSCULAR; INTRAVENOUS; SUBCUTANEOUS at 09:44

## 2022-06-08 RX ADMIN — SODIUM CHLORIDE 125 MILLILITER(S): 9 INJECTION, SOLUTION INTRAVENOUS at 09:49

## 2022-06-08 RX ADMIN — Medication 30 MILLILITER(S): at 04:39

## 2022-06-08 NOTE — ED PROVIDER NOTE - NS_BEDUNITTYPES_ED_ALL_ED
67 M with h/o bradycardia s/p ppm, recent admission for hypoxia secondary to COVID-19 complicated by Afib/V tach presenting with shortness of breath and lightheadedness.    Problem/Plan - 1:  ·  Problem: Lightheadedness.  Plan: - Etiology unclear. Pt had recent abnormal stress. Also possible this may be arrhythmia related  - Trops flat  - Monitor on tele   - management as per cards    Problem/Plan - 2:  ·  Problem: Abnormal stress test.  Plan: - Pt declined cardiac cath last admission but amenable now   - Monitor on tele   - Cardiology consult appreciated    Problem/Plan - 3:  ·  Problem: COVID-19.  Plan: - Completed course Decadron and Remdesivir  - Symptoms improved. No longer hypoxic  - Will continue to monitor. Supportive care.     Problem/Plan - 4:  ·  Problem: Atrial fibrillation, unspecified type.  Plan: - Continue Metoprolol and Xarelto.     Problem/Plan - 5:  ·  Problem: SVT (supraventricular tachycardia).  Plan: - Continue Amiodarone.      SURGERY

## 2022-06-08 NOTE — H&P ADULT - ASSESSMENT
32yo F with no PMH presenting to the ED with 1 day of acute onset abdominal pain with symptoms consistent with mild acute cholecystitis, Grade 1 by [place] criteria.    Plan:  - Admit to B Team Surgery under Dr. Boles, floor bed  - Added on to OR for lap coleen, possible open  - Consent in chart  - NPO/IVF  - Zosyn  - Pain control as needed    D/w Dr. Elvi De Leon, PGY2  B Team Surgery   [id number]

## 2022-06-08 NOTE — ED PROVIDER NOTE - NSICDXPASTMEDICALHX_GEN_ALL_CORE_FT
PAST MEDICAL HISTORY:  Blood transfusion affecting pregnancy H/o blood transfusion in first pregnancy due to severe anemia    Miscarriage X2 with no D&C     (normal spontaneous vaginal delivery) 2007 FT M 7#8  2019 FT M 6#5

## 2022-06-08 NOTE — H&P ADULT - ATTENDING COMMENTS
Symptomatic cholelithiasis  a.  Admit to B surgery / [facility]  b.  Keep NPO at this time  c.  Continue IVF resuscitation  d.  Plan for laparoscopic cholecystectomy    At risk for malnutrition  a.  NPO    At risk for DVT  a.  Compute caprini score

## 2022-06-08 NOTE — ED PROVIDER NOTE - ATTENDING CONTRIBUTION TO CARE
32 yo with no PMH presenting with a burning epigastric pain that waxes and wanes and has been getting progressively worse since 2100 tonight.  There is no VD but there is some associated nausea.      Gen: Well appearing in NAD  Head: NC/AT  Neck: trachea midline  Resp:  No distress  Abd: soft NT ND  Ext: no deformities  Neuro:  A&O appears non focal  Skin:  Warm and dry as visualized  Psych:  Normal affect and mood     32 yo w epigastric burning.  Differential includes but is not limited to pancreatitis, gastritis, GERD, GB disease.  Will get labs and treat symptomatically.  Depending on response and lab results will expand workup to include imaging.  *The above represents an initial assessment/impression. Please refer to progress notes for potential changes in patient clinical course*

## 2022-06-08 NOTE — ED PROVIDER NOTE - CLINICAL SUMMARY MEDICAL DECISION MAKING FREE TEXT BOX
32 yo F w/ no significant PMHx presenting to ED for c/o epigastric pain since 9 PM, burning in nature, waxing and waning, becoming more severe. Associated nausea. Denies diarrhea, melena or hematochezia. Denies vomiting. No hx of regular NSAID use. Tried TUMS w/o significant relief. Has IUD. No regular alcohol consumption. Pain not associated w/ eating. Exam as above. Concern for PUD, gastritis, IBS. Less likely pancreatitis. Low concern for biliary pathology based on story. Labs, symptomatic tx, will reassess.

## 2022-06-08 NOTE — H&P ADULT - HISTORY OF PRESENT ILLNESS
32yo F with no PMH presenting to the ED with 1 day of acute onset abdominal pain. Patient reports pain started last night at around 9:30pm after dinner. Associated with nausea but no vomiting, fevers but no chills. Never had similar pain before. Tried tums and pepcid with no effect.     In the ED, patient afebrile and hemodynamically stable. Labs showed WBC 11, LFTs, tibili normal. RUQ US showed gallstones without thickening or pericholecystic fluid.

## 2022-06-08 NOTE — ED ADULT NURSE NOTE - OBJECTIVE STATEMENT
32 y/o female, a&ox4, ambulatory, received to rm 28 with c/o epigastric pain. Pt reports 9/10 intermittent epigastric pain radiating to the abdomin and back. Denies PMH. Abdomen is soft, non-tender, non-rigid. Respirations are even and unlabored, no signs of respiratory distress. NSR on cardiac monitor.

## 2022-06-08 NOTE — ED ADULT TRIAGE NOTE - CHIEF COMPLAINT QUOTE
c/o mid-epigastric pain radiating to lower abd that started this morning, +nausea. Took Milanta and TUMS, verbalized no relief. Hypotensive in triage.  PMHX Anemia

## 2022-06-08 NOTE — PATIENT PROFILE ADULT - FALL HARM RISK - RISK INTERVENTIONS
Assistance OOB with selected safe patient handling equipment/Communicate Fall Risk and Risk Factors to all staff, patient, and family/Reinforce activity limits and safety measures with patient and family/Review medications for side effects contributing to fall risk/Sit up slowly, dangle for a short time, stand at bedside before walking/Toileting schedule using arm’s reach rule for commode and bathroom/Visual Cue: Yellow wristband/Bed in lowest position, wheels locked, appropriate side rails in place/Call bell, personal items and telephone in reach/Instruct patient to call for assistance before getting out of bed or chair/Non-slip footwear when patient is out of bed/[place] to call system/Physically safe environment - no spills, clutter or unnecessary equipment/Purposeful Proactive Rounding/Room/bathroom lighting operational, light cord in reach Monitor for mental status changes/Monitor gait and stability/Bed in lowest position, wheels locked, appropriate side rails in place/Call bell, personal items and telephone in reach/Instruct patient to call for assistance before getting out of bed or chair/Non-slip footwear when patient is out of bed

## 2022-06-08 NOTE — ED PROVIDER NOTE - OBJECTIVE STATEMENT
30 yo F w/ no significant PMHx presenting to ED for c/o epigastric pain since 9 PM, burning in nature, waxing and waning, becoming more severe. Associated nausea. Denies diarrhea, melena or hematochezia. Denies vomiting. No hx of regular NSAID use. Tried TUMS w/o significant relief. Has IUD. No regular alcohol consumption. Pain not associated w/ eating.

## 2022-06-08 NOTE — ED PROVIDER NOTE - PHYSICAL EXAMINATION
Gen: A&Ox4   HEENT: Atraumatic. Mucous membranes moist, no scleral icterus.  CV: RRR. No significant lower extremity edema.   Resp: Respirations unlabored. CTAB, no rales, no wheezes.  GI: Abdomen ttp in epigastrium, otherwise non tender to palpation, soft and non-distended.   Skin/MSK: No open wounds. No ecchymosis appreciated.  Neuro: Following commands. EOMI. Pupils ERRL.  Psych: Appropriate mood, cooperative

## 2022-06-08 NOTE — ED PROVIDER NOTE - NS ED ROS FT
Gen: Denies fever.   HEENT: Denies headache. Denies congestion.  CV: Denies chest pain. Denies lightheadedness.  Skin: Denies rash.   Resp: Denies SOB. Denies cough.  GI: +abd pain. +nausea. Denies vomiting. Denies diarrhea. Denies melena. Denies hematochezia.  Msk: Denies extremity swelling. Denies extremity pain.  : Denies dysuria. Denies hematuria.  Neuro: Denies LOC. Denies dizziness. Denies new numbness/tingling. Denies new focal weakness.  Psych: Denies SI

## 2022-06-08 NOTE — ED PROVIDER NOTE - PROGRESS NOTE DETAILS
DD ED ATTG:  rec'd s/o from Dr Zee  31F epig/RUQ burning x 1 day, US done shows equivocal for coleen.  Needs surg consult.    Surg consulted at this time, req type and pt/inr.  Pt in moderate pain, epig and RUQ ttp (+)olivo's.  D/w pt mgmt options like HIDA vs admit for CCY vs D/c w/oupt surg f/u.  Pt uncomfortable.  Surg may admit for CCY. BONNIE Muñiz- received sign out from Dr. Zee. pt c/o pain, ordered morphine and hcg. as per surgery accepted to Dr. Walden

## 2022-06-08 NOTE — H&P ADULT - NSHPLABSRESULTS_GEN_ALL_CORE
LABS:                          10.5   11.18 )-----------( 299      ( 08 Jun 2022 04:33 )             34.2       06-08    136  |  101  |  20  ----------------------------<  125<H>  4.2   |  26  |  0.72    Ca    9.7      08 Jun 2022 04:33    TPro  7.6  /  Alb  4.6  /  TBili  0.8  /  DBili  x   /  AST  11  /  ALT  10  /  AlkPhos  56  06-08    PT/INR - ( 08 Jun 2022 07:45 )   PT: 13.0 sec;   INR: 1.12 ratio       PTT - ( 08 Jun 2022 07:45 )  PTT:29.6 sec    _______________________________________  RADIOLOGY & ADDITIONAL STUDIES:    < from: US Abdomen Upper Quadrant Right (06.08.22 @ 06:53) >    ACC: [id number] EXAM:  US ABDOMEN RT UPR QUADRANT                          PROCEDURE DATE:  06/08/2022      INTERPRETATION:  CLINICAL INFORMATION: Right upper quadrant pain    COMPARISON: None available.    TECHNIQUE: Sonography of the right upper quadrant.    FINDINGS:    Liver: 13.3 cm. Increased echogenicity, which may be due to fatty   infiltration.  Bile ducts: Normal caliber. Common bile duct measures 3 mm.  Gallbladder: Mildly distended. Stones. No significant wall thickening or   pericholecystic fluid. Negative sonographic Freeman sign. The patient was   premedicated.  Pancreas: Poorly visualized due to bowel gas.  Right kidney: 9.4 cm. No hydronephrosis.  Ascites: None.  IVC: Visualized portions are within normal limits.    IMPRESSION:    Sonographic findings equivocal for acute cholecystitis. If there is   clinical suspicion for acute cholecystitis, a hepatobiliary scan may be   obtained for further evaluation.    < end of copied text >

## 2022-06-08 NOTE — H&P ADULT - NSHPPHYSICALEXAM_GEN_ALL_CORE
VITALS:  Vital Signs Last 24 Hrs  T(C): 36.2 (08 Jun 2022 03:01), Max: 36.2 (08 Jun 2022 03:01)  T(F): 97.1 (08 Jun 2022 03:01), Max: 97.1 (08 Jun 2022 03:01)  HR: 81 (08 Jun 2022 08:05) (64 - 81)  BP: 102/43 (08 Jun 2022 08:05) (97/49 - 116/50)  BP(mean): --  RR: 14 (08 Jun 2022 08:05) (14 - 18)  SpO2: 100% (08 Jun 2022 08:05) (98% - 100%)    PHYSICAL EXAM:  Gen: No acute distress.  Abd: Soft, nontender, nondistended, no rebound, no guarding (received 4mg morphine prior to exam)  Ext: Warm and well-perfused

## 2022-06-09 ENCOUNTER — RESULT REVIEW (OUTPATIENT)
Age: 32
End: 2022-06-09

## 2022-06-09 ENCOUNTER — TRANSCRIPTION ENCOUNTER (OUTPATIENT)
Age: 32
End: 2022-06-09

## 2022-06-09 VITALS
OXYGEN SATURATION: 98 % | SYSTOLIC BLOOD PRESSURE: 132 MMHG | DIASTOLIC BLOOD PRESSURE: 81 MMHG | RESPIRATION RATE: 18 BRPM | HEART RATE: 95 BPM | TEMPERATURE: 98 F

## 2022-06-09 LAB
ALBUMIN SERPL ELPH-MCNC: 3.2 G/DL — LOW (ref 3.3–5)
ALP SERPL-CCNC: 42 U/L — SIGNIFICANT CHANGE UP (ref 40–120)
ALT FLD-CCNC: 6 U/L — SIGNIFICANT CHANGE UP (ref 4–33)
ANION GAP SERPL CALC-SCNC: 8 MMOL/L — SIGNIFICANT CHANGE UP (ref 7–14)
APTT BLD: 29.3 SEC — SIGNIFICANT CHANGE UP (ref 27–36.3)
AST SERPL-CCNC: 9 U/L — SIGNIFICANT CHANGE UP (ref 4–32)
BILIRUB SERPL-MCNC: 0.8 MG/DL — SIGNIFICANT CHANGE UP (ref 0.2–1.2)
BLD GP AB SCN SERPL QL: NEGATIVE — SIGNIFICANT CHANGE UP
BUN SERPL-MCNC: 11 MG/DL — SIGNIFICANT CHANGE UP (ref 7–23)
CALCIUM SERPL-MCNC: 8 MG/DL — LOW (ref 8.4–10.5)
CHLORIDE SERPL-SCNC: 110 MMOL/L — HIGH (ref 98–107)
CO2 SERPL-SCNC: 22 MMOL/L — SIGNIFICANT CHANGE UP (ref 22–31)
CREAT SERPL-MCNC: 0.66 MG/DL — SIGNIFICANT CHANGE UP (ref 0.5–1.3)
CULTURE RESULTS: SIGNIFICANT CHANGE UP
EGFR: 119 ML/MIN/1.73M2 — SIGNIFICANT CHANGE UP
GLUCOSE SERPL-MCNC: 124 MG/DL — HIGH (ref 70–99)
HCT VFR BLD CALC: 27.2 % — LOW (ref 34.5–45)
HGB BLD-MCNC: 8.3 G/DL — LOW (ref 11.5–15.5)
INR BLD: 1.15 RATIO — SIGNIFICANT CHANGE UP (ref 0.88–1.16)
MAGNESIUM SERPL-MCNC: 1.9 MG/DL — SIGNIFICANT CHANGE UP (ref 1.6–2.6)
MCHC RBC-ENTMCNC: 20 PG — LOW (ref 27–34)
MCHC RBC-ENTMCNC: 30.5 GM/DL — LOW (ref 32–36)
MCV RBC AUTO: 65.5 FL — LOW (ref 80–100)
NRBC # BLD: 0 /100 WBCS — SIGNIFICANT CHANGE UP
NRBC # FLD: 0 K/UL — SIGNIFICANT CHANGE UP
PHOSPHATE SERPL-MCNC: 2.7 MG/DL — SIGNIFICANT CHANGE UP (ref 2.5–4.5)
PLATELET # BLD AUTO: 263 K/UL — SIGNIFICANT CHANGE UP (ref 150–400)
POTASSIUM SERPL-MCNC: 3.6 MMOL/L — SIGNIFICANT CHANGE UP (ref 3.5–5.3)
POTASSIUM SERPL-SCNC: 3.6 MMOL/L — SIGNIFICANT CHANGE UP (ref 3.5–5.3)
PROT SERPL-MCNC: 5 G/DL — LOW (ref 6–8.3)
PROTHROM AB SERPL-ACNC: 13.4 SEC — SIGNIFICANT CHANGE UP (ref 10.5–13.4)
RBC # BLD: 4.15 M/UL — SIGNIFICANT CHANGE UP (ref 3.8–5.2)
RBC # FLD: 15.4 % — HIGH (ref 10.3–14.5)
RH IG SCN BLD-IMP: POSITIVE — SIGNIFICANT CHANGE UP
SODIUM SERPL-SCNC: 140 MMOL/L — SIGNIFICANT CHANGE UP (ref 135–145)
SPECIMEN SOURCE: SIGNIFICANT CHANGE UP
WBC # BLD: 7.62 K/UL — SIGNIFICANT CHANGE UP (ref 3.8–10.5)
WBC # FLD AUTO: 7.62 K/UL — SIGNIFICANT CHANGE UP (ref 3.8–10.5)

## 2022-06-09 PROCEDURE — 47562 LAPAROSCOPIC CHOLECYSTECTOMY: CPT | Mod: GC

## 2022-06-09 PROCEDURE — 88304 TISSUE EXAM BY PATHOLOGIST: CPT | Mod: 26

## 2022-06-09 RX ORDER — ONDANSETRON 8 MG/1
4 TABLET, FILM COATED ORAL ONCE
Refills: 0 | Status: COMPLETED | OUTPATIENT
Start: 2022-06-09 | End: 2022-06-09

## 2022-06-09 RX ORDER — ACETAMINOPHEN 500 MG
3 TABLET ORAL
Qty: 0 | Refills: 0 | DISCHARGE
Start: 2022-06-09

## 2022-06-09 RX ORDER — SODIUM CHLORIDE 9 MG/ML
500 INJECTION, SOLUTION INTRAVENOUS ONCE
Refills: 0 | Status: COMPLETED | OUTPATIENT
Start: 2022-06-09 | End: 2022-06-09

## 2022-06-09 RX ORDER — OXYCODONE HYDROCHLORIDE 5 MG/1
1 TABLET ORAL
Qty: 10 | Refills: 0
Start: 2022-06-09

## 2022-06-09 RX ORDER — HYDROMORPHONE HYDROCHLORIDE 2 MG/ML
0.5 INJECTION INTRAMUSCULAR; INTRAVENOUS; SUBCUTANEOUS
Refills: 0 | Status: DISCONTINUED | OUTPATIENT
Start: 2022-06-09 | End: 2022-06-09

## 2022-06-09 RX ORDER — OXYCODONE HYDROCHLORIDE 5 MG/1
5 TABLET ORAL ONCE
Refills: 0 | Status: DISCONTINUED | OUTPATIENT
Start: 2022-06-09 | End: 2022-06-09

## 2022-06-09 RX ORDER — INFLUENZA VIRUS VACCINE 15; 15; 15; 15 UG/.5ML; UG/.5ML; UG/.5ML; UG/.5ML
0.5 SUSPENSION INTRAMUSCULAR ONCE
Refills: 0 | Status: DISCONTINUED | OUTPATIENT
Start: 2022-06-09 | End: 2022-06-09

## 2022-06-09 RX ADMIN — PIPERACILLIN AND TAZOBACTAM 25 GRAM(S): 4; .5 INJECTION, POWDER, LYOPHILIZED, FOR SOLUTION INTRAVENOUS at 11:16

## 2022-06-09 RX ADMIN — PIPERACILLIN AND TAZOBACTAM 25 GRAM(S): 4; .5 INJECTION, POWDER, LYOPHILIZED, FOR SOLUTION INTRAVENOUS at 01:06

## 2022-06-09 RX ADMIN — HYDROMORPHONE HYDROCHLORIDE 0.5 MILLIGRAM(S): 2 INJECTION INTRAMUSCULAR; INTRAVENOUS; SUBCUTANEOUS at 15:42

## 2022-06-09 RX ADMIN — SODIUM CHLORIDE 500 MILLILITER(S): 9 INJECTION, SOLUTION INTRAVENOUS at 03:58

## 2022-06-09 RX ADMIN — OXYCODONE HYDROCHLORIDE 5 MILLIGRAM(S): 5 TABLET ORAL at 15:32

## 2022-06-09 RX ADMIN — HYDROMORPHONE HYDROCHLORIDE 0.5 MILLIGRAM(S): 2 INJECTION INTRAMUSCULAR; INTRAVENOUS; SUBCUTANEOUS at 15:31

## 2022-06-09 RX ADMIN — ONDANSETRON 4 MILLIGRAM(S): 8 TABLET, FILM COATED ORAL at 15:05

## 2022-06-09 RX ADMIN — HYDROMORPHONE HYDROCHLORIDE 1 MILLIGRAM(S): 2 INJECTION INTRAMUSCULAR; INTRAVENOUS; SUBCUTANEOUS at 19:43

## 2022-06-09 RX ADMIN — OXYCODONE HYDROCHLORIDE 5 MILLIGRAM(S): 5 TABLET ORAL at 16:10

## 2022-06-09 RX ADMIN — HYDROMORPHONE HYDROCHLORIDE 1 MILLIGRAM(S): 2 INJECTION INTRAMUSCULAR; INTRAVENOUS; SUBCUTANEOUS at 15:15

## 2022-06-09 RX ADMIN — HYDROMORPHONE HYDROCHLORIDE 1 MILLIGRAM(S): 2 INJECTION INTRAMUSCULAR; INTRAVENOUS; SUBCUTANEOUS at 15:05

## 2022-06-09 RX ADMIN — ENOXAPARIN SODIUM 40 MILLIGRAM(S): 100 INJECTION SUBCUTANEOUS at 06:00

## 2022-06-09 NOTE — DISCHARGE NOTE PROVIDER - NSDCFUADDINST_GEN_ALL_CORE_FT
BATHING: Please do not submerge wound underwater. You may shower and/or sponge bathe. You may shower. Pat Dry abdomen. Leave the white steri strips in place, they will fall off on their own in approximately 5-7 days.   ACTIVITY: No heavy lifting anything more than 10-15lbs or straining. Otherwise, you may return to your usual level of physical activity. If you are taking narcotic pain medication (such as Percocet), do NOT drive a car, operate machinery or make important decisions.  NOTIFY YOUR SURGEON IF: You have any bleeding that does not stop, any pus draining from your wound, any fever (over 100.4 F) or chills, persistent nausea/vomiting with inability to tolerate food or liquids, persistent diarrhea, or if your pain is not controlled on your discharge pain medications.  FOLLOW-UP:  1. Please call to make a follow-up appointment within one week of discharge, you may discuss when cleared to return to school/work at your follow up appointment with your surgeon.    You may take 2 regular (325mg each) or 2 extra strength (500mg each) acetaminophen (Tylenol) tablets every 6 hours for mild to moderate pain. You may also take 2-3 ibuprofen (Advil, Motrin) tablets (200mg each) every 6 hours for mild to moderate pain. You may alternate them so that you take acetaminophen or ibuprofen every 3 hours if needed. For severe pain not relieved by acetaminophen or ibuprofen, you may take 1 oxycodone tablet (prescription sent to your pharmacy) every 6 hours.

## 2022-06-09 NOTE — DISCHARGE NOTE NURSING/CASE MANAGEMENT/SOCIAL WORK - NSDCPEFALRISK_GEN_ALL_CORE
For information on Fall & Injury Prevention, visit: [email or web address] OR  [email or web address] OR  https://www.cdc.gov/steadi/patient.html

## 2022-06-09 NOTE — PROGRESS NOTE ADULT - SUBJECTIVE AND OBJECTIVE BOX
TEAM Surgery Progress Note    INTERVAL EVENTS: Planned for OR today for lap coleen.  SUBJECTIVE: Patient seen and examined at bedside with surgical team      OBJECTIVE:    Vital Signs Last 24 Hrs  T(C): 36.9 (2022 22:15), Max: 36.9 (2022 19:19)  T(F): 98.4 (2022 22:15), Max: 98.4 (2022 19:19)  HR: 73 (2022 22:15) (62 - 90)  BP: 115/66 (2022 22:15) (97/49 - 116/50)  BP(mean): --  RR: 18 (2022 22:15) (14 - 18)  SpO2: 100% (2022 22:15) (98% - 100%)I&O's Detail    2022 07:01  -  2022 02:09  --------------------------------------------------------  IN:    Oral Fluid: 240 mL    sodium chloride 0.9%: 500 mL  Total IN: 740 mL    OUT:  Total OUT: 0 mL    Total NET: 740 mL      MEDICATIONS  (STANDING):  acetaminophen     Tablet .. 975 milliGRAM(s) Oral every 6 hours  enoxaparin Injectable 40 milliGRAM(s) SubCutaneous every 24 hours  piperacillin/tazobactam IVPB.. 3.375 Gram(s) IV Intermittent every 8 hours  sodium chloride 0.9%. 1000 milliLiter(s) (125 mL/Hr) IV Continuous <Continuous>    MEDICATIONS  (PRN):  HYDROmorphone  Injectable 0.5 milliGRAM(s) IV Push every 4 hours PRN Moderate Pain (4 - 6)  HYDROmorphone  Injectable 1 milliGRAM(s) IV Push every 4 hours PRN Severe Pain (7 - 10)      PHYSICAL EXAM:  Gen: No acute distress.  Abd: Soft, nontender, nondistended, no rebound, no guarding (received 4mg morphine prior to exam)  Ext: Warm and well-perfused    LABS:                        10.5   11.18 )-----------( 299      ( 2022 04:33 )             34.2         136  |  101  |  20  ----------------------------<  125<H>  4.2   |  26  |  0.72    Ca    9.7      2022 04:33    TPro  7.6  /  Alb  4.6  /  TBili  0.8  /  DBili  x   /  AST  11  /  ALT  10  /  AlkPhos  56  08    PT/INR - ( 2022 07:45 )   PT: 13.0 sec;   INR: 1.12 ratio         PTT - ( 2022 07:45 )  PTT:29.6 sec  LIVER FUNCTIONS - ( 2022 04:33 )  Alb: 4.6 g/dL / Pro: 7.6 g/dL / ALK PHOS: 56 U/L / ALT: 10 U/L / AST: 11 U/L / GGT: x           Urinalysis Basic - ( 2022 08:00 )    Color: Yellow / Appearance: Turbid / S.029 / pH: x  Gluc: x / Ketone: Negative  / Bili: Negative / Urobili: <2 mg/dL   Blood: x / Protein: 20 / Nitrite: Negative   Leuk Esterase: Negative / RBC: x / WBC x   Sq Epi: x / Non Sq Epi: Few / Bacteria: x      ABO Interpretation: B (22 @ 08:19)  ABO Interpretation: B (22 @ 08:07)      IMAGING:     Acute Care Surgery Progress Note    INTERVAL EVENTS: Planned for OR today for lap coleen.    SUBJECTIVE: Patient seen and examined at bedside with surgical team. Pain controlled. No nausea/vomiting.      OBJECTIVE:    Vital Signs Last 24 Hrs  T(C): 36.9 (2022 22:15), Max: 36.9 (2022 19:19)  T(F): 98.4 (2022 22:15), Max: 98.4 (2022 19:19)  HR: 73 (:15) (62 - 90)  BP: 115/66 (2022 22:15) (97/49 - 116/50)  BP(mean): --  RR: 18 (:15) (14 - 18)  SpO2: 100% (2022 22:15) (98% - 100%)I&O's Detail    2022 07:01  -  2022 02:09  --------------------------------------------------------  IN:    Oral Fluid: 240 mL    sodium chloride 0.9%: 500 mL  Total IN: 740 mL    OUT:  Total OUT: 0 mL    Total NET: 740 mL      MEDICATIONS  (STANDING):  acetaminophen     Tablet .. 975 milliGRAM(s) Oral every 6 hours  enoxaparin Injectable 40 milliGRAM(s) SubCutaneous every 24 hours  piperacillin/tazobactam IVPB.. 3.375 Gram(s) IV Intermittent every 8 hours  sodium chloride 0.9%. 1000 milliLiter(s) (125 mL/Hr) IV Continuous <Continuous>    MEDICATIONS  (PRN):  HYDROmorphone  Injectable 0.5 milliGRAM(s) IV Push every 4 hours PRN Moderate Pain (4 - 6)  HYDROmorphone  Injectable 1 milliGRAM(s) IV Push every 4 hours PRN Severe Pain (7 - 10)      PHYSICAL EXAM:  Gen: No acute distress.  Abd: Soft, nontender, nondistended, no rebound, no guarding (received 4mg morphine prior to exam)  Ext: Warm and well-perfused    LABS:                        10.5   11.18 )-----------( 299      ( 2022 04:33 )             34.2         136  |  101  |  20  ----------------------------<  125<H>  4.2   |  26  |  0.72    Ca    9.7      2022 04:33    TPro  7.6  /  Alb  4.6  /  TBili  0.8  /  DBili  x   /  AST  11  /  ALT  10  /  AlkPhos  56  -08    PT/INR - ( 2022 07:45 )   PT: 13.0 sec;   INR: 1.12 ratio         PTT - ( 2022 07:45 )  PTT:29.6 sec  LIVER FUNCTIONS - ( 2022 04:33 )  Alb: 4.6 g/dL / Pro: 7.6 g/dL / ALK PHOS: 56 U/L / ALT: 10 U/L / AST: 11 U/L / GGT: x           Urinalysis Basic - ( 2022 08:00 )    Color: Yellow / Appearance: Turbid / S.029 / pH: x  Gluc: x / Ketone: Negative  / Bili: Negative / Urobili: <2 mg/dL   Blood: x / Protein: 20 / Nitrite: Negative   Leuk Esterase: Negative / RBC: x / WBC x   Sq Epi: x / Non Sq Epi: Few / Bacteria: x      ABO Interpretation: B (22 @ 08:19)  ABO Interpretation: B (22 @ 08:07)      IMAGING:

## 2022-06-09 NOTE — DISCHARGE NOTE PROVIDER - NSDCCPCAREPLAN_GEN_ALL_CORE_FT
PRINCIPAL DISCHARGE DIAGNOSIS  Diagnosis: Symptomatic cholelithiasis  Assessment and Plan of Treatment:

## 2022-06-09 NOTE — DISCHARGE NOTE PROVIDER - CARE PROVIDER_API CALL
Bert Schumacher)  Surgery; Surgical Critical Care  [address], [address]  [place] [address]  Phone: [phone]  Fax: [phone]  Follow Up Time: 2 weeks

## 2022-06-09 NOTE — DISCHARGE NOTE PROVIDER - HOSPITAL COURSE
32yo F with no PMH presented to [facility] ED 6/8 with 1 day of acute onset abdominal pain associated with nausea and fevers.  In the ED, patient afebrile and hemodynamically stable. Labs showed WBC 11, LFTs, tibili normal. RUQ US showed gallstones without thickening or pericholecystic fluid.  Patient was admitted to general surgery with plans for laparoscopic cholecystectomy for symptomatic cholelithiasis.     ***complete up to 6/9 AM    6/9, patient underwent****  ***Patient tolerated operation well and there were no post-operative complications identified. Patient remained hemodynamically stable in the PACU and transferred to the surgical floor. Diet was restarted and advanced as tolerated. Pain control was transitioned from IV to PO pain meds. At this time, patient is currently ambulating, voiding, tolerating a regular diet. Pain well controlled on PO pain meds. Patient felt safe with being discharged, and understood and agreed with plan. 32yo F with no PMH presented to [facility] ED 6/8 with 1 day of acute onset abdominal pain associated with nausea and fevers.  In the ED, patient afebrile and hemodynamically stable. Labs showed WBC 11, LFTs, tibili normal. RUQ US showed gallstones without thickening or pericholecystic fluid.  Patient was admitted to general surgery with plans for laparoscopic cholecystectomy for symptomatic cholelithiasis.     On 6/9, patient underwent laparoscopic cholecystectomy. Patient tolerated operation well and there were no post-operative complications identified. Patient remained hemodynamically stable in the PACU and transferred to the surgical floor. Diet was restarted and advanced as tolerated. At this time, patient is currently ambulating, voiding, tolerating a regular diet. Pain well controlled on PO pain meds. Patient felt safe with being discharged, and understood and agreed with plan.

## 2022-06-09 NOTE — DISCHARGE NOTE NURSING/CASE MANAGEMENT/SOCIAL WORK - PATIENT PORTAL LINK FT
no edema, no murmurs, regular rate and rhythm
You can access the FollowMyHealth Patient Portal offered by [facility] by registering at the following website: [email or web address]. By joining [employer]’s FollowMyHealth portal, you will also be able to view your health information using other applications (apps) compatible with our system.

## 2022-06-09 NOTE — PROGRESS NOTE ADULT - ASSESSMENT
30yo F with no PMH presenting to the ED with 1 day of acute onset abdominal pain with symptoms consistent with mild acute cholecystitis, Grade 1 by [place] criteria.    Plan:  - Added on to OR for lap coleen, possible open  - Consent in chart  - NPO/IVF  - Zosyn  - Pain control as needed    D/w Dr. Boles 30yo F with no PMH presenting to the ED with 1 day of acute onset abdominal pain with symptoms consistent with mild acute cholecystitis, Grade 1 by [place] criteria.    Plan:  - Added on to OR for lap coleen, possible open  - Consent in chart  - NPO/IVF  - Zosyn  - Pain control as needed  - Possible dc later today after surgery      B Team Surgery  [id number]

## 2022-06-09 NOTE — DISCHARGE NOTE PROVIDER - NSDCMRMEDTOKEN_GEN_ALL_CORE_FT
oxyCODONE 5 mg oral tablet: 1 tab(s) orally every 6 hours MDD:4 max daily   acetaminophen 325 mg oral tablet: 3 tab(s) orally every 6 hours  oxyCODONE 5 mg oral tablet: 1 tab(s) orally every 6 hours MDD:4 max daily

## 2022-06-16 LAB — SURGICAL PATHOLOGY STUDY: SIGNIFICANT CHANGE UP

## 2022-06-17 ENCOUNTER — APPOINTMENT (OUTPATIENT)
Dept: TRAUMA SURGERY | Facility: HOSPITAL | Age: 32
End: 2022-06-17
Payer: MEDICAID

## 2022-06-17 VITALS
HEIGHT: 64 IN | HEART RATE: 73 BPM | WEIGHT: 125 LBS | TEMPERATURE: 97.8 F | BODY MASS INDEX: 21.34 KG/M2 | DIASTOLIC BLOOD PRESSURE: 63 MMHG | SYSTOLIC BLOOD PRESSURE: 108 MMHG

## 2022-06-17 PROCEDURE — 99024 POSTOP FOLLOW-UP VISIT: CPT

## 2022-06-17 NOTE — HISTORY OF PRESENT ILLNESS
[de-identified] : Ilsa has recovered well and denies fevers, chills, abdominal pain, SOB/dyspnea or weakness/fatigue. Tolerating adequate PO intake and GI activity (bowel movements) has recovered without constipation or diarrhea. No complaints regarding incisions and dressings.\par

## 2022-06-17 NOTE — PLAN
[FreeTextEntry1] : Mrs. Wheat underwent a laparoscopic cholecystectomy and has recovered well. The incisions are well healed and good PO nutrition is being tolerated. The pathology revealed no evidence of malignancy. \par \par Resume normal activity with gradual resumption of strenuous activity\par Avoid fatty foods. Low fat diet should diarrhea develop\par Follow up with me in clinic if yellowing of skin develops or fever/chills and RUQ pain develops. \par \par \par Bert Schumacher MD\par Acute Care Surgery\par

## 2022-06-17 NOTE — PHYSICAL EXAM
[de-identified] : Well ,comfortable. [de-identified] : Soft, nontender, nondistended. The incisions are well healed without signs of erythema, cellulitis or drainage. No palpable hernia formation.\par \par

## 2023-03-02 ENCOUNTER — EMERGENCY (EMERGENCY)
Facility: HOSPITAL | Age: 33
LOS: 1 days | Discharge: HOME | End: 2023-03-02
Attending: EMERGENCY MEDICINE | Admitting: EMERGENCY MEDICINE
Payer: MEDICAID

## 2023-03-02 VITALS
RESPIRATION RATE: 17 BRPM | SYSTOLIC BLOOD PRESSURE: 110 MMHG | OXYGEN SATURATION: 100 % | HEART RATE: 68 BPM | TEMPERATURE: 98 F | DIASTOLIC BLOOD PRESSURE: 68 MMHG

## 2023-03-02 PROCEDURE — 99284 EMERGENCY DEPT VISIT MOD MDM: CPT

## 2023-03-02 RX ORDER — ACETAMINOPHEN 500 MG
975 TABLET ORAL ONCE
Refills: 0 | Status: COMPLETED | OUTPATIENT
Start: 2023-03-02 | End: 2023-03-02

## 2023-03-02 RX ORDER — METOCLOPRAMIDE HCL 10 MG
10 TABLET ORAL ONCE
Refills: 0 | Status: COMPLETED | OUTPATIENT
Start: 2023-03-02 | End: 2023-03-02

## 2023-03-02 RX ORDER — SODIUM CHLORIDE 9 MG/ML
1000 INJECTION INTRAMUSCULAR; INTRAVENOUS; SUBCUTANEOUS ONCE
Refills: 0 | Status: COMPLETED | OUTPATIENT
Start: 2023-03-02 | End: 2023-03-02

## 2023-03-02 RX ADMIN — Medication 10 MILLIGRAM(S): at 23:49

## 2023-03-02 RX ADMIN — Medication 975 MILLIGRAM(S): at 23:43

## 2023-03-02 RX ADMIN — SODIUM CHLORIDE 1000 MILLILITER(S): 9 INJECTION INTRAMUSCULAR; INTRAVENOUS; SUBCUTANEOUS at 23:48

## 2023-03-02 NOTE — ED PROVIDER NOTE - OBJECTIVE STATEMENT
31YO F hx anemia p/w headache. onset 4d prior. located on forehead and posterior to the L eye. a/w nausea, excessive L eye tearing. denies vomiting, fevers, neck pain, cough, sob. denies hx of recent head, neck trauma, denies fevers, recent viral illness, denies blurry vision, flashes/floaters of light, photophobia. pain worsening since onset, not a/w numbness/weakness/tingling to arms/legs. denies prior hx of h/a, no prior head imaging. nonsmoker. pt also endorses prior hx of lasik eye surgery in 2017, non-contact lens user. LMP uncertain although has IUD.

## 2023-03-02 NOTE — ED PROVIDER NOTE - PATIENT PORTAL LINK FT
You can access the FollowMyHealth Patient Portal offered by [facility] by registering at the following website: [email or web address]. By joining [email or web address]’s FollowMyHealth portal, you will also be able to view your health information using other applications (apps) compatible with our system.

## 2023-03-02 NOTE — ED PROVIDER NOTE - NSFOLLOWUPINSTRUCTIONS_ED_ALL_ED_FT
--take tylenol or motrin as needed for pain. Take tylenol 1000mg every 6 hours alternating with motrin 600 mg every 6 hours (take tylenol or motrin then three hours later take the other then three hours later take the first).  --today, the imaging tests we did include CT head. results significant for no abnormalities. we have included these test results in your paperwork. please take them to your follow-up appointment  --given that you were in the ED today, we recommend a followup visit with your general doctor (primary care doctor) within 7 days.   --your diagnosis is: migraine  --return to the ED if headache changes in type or nature, if vision changes (blurry vision, double vision), if neuro changes of numbness/weakness/tingling in arms/legs. --take tylenol or motrin as needed for pain. Take tylenol 1000mg every 6 hours alternating with motrin 600 mg every 6 hours (take tylenol or motrin then three hours later take the other then three hours later take the first).  --today, the imaging tests we did include CT head. results significant for fluid-filled sinuses. we have included these test results in your paperwork. please take them to your follow-up appointment  --given that you were in the ED today, we recommend a followup visit with your general doctor (primary care doctor) within 7 days.   --your diagnosis is: migraine  --return to the ED if headache changes in type or nature, if vision changes (blurry vision, double vision), if neuro changes of numbness/weakness/tingling in arms/legs.

## 2023-03-02 NOTE — ED PROVIDER NOTE - PROGRESS NOTE DETAILS
Cally Arora MD, PGY-3: headache improved. ct head result pending. will dc if normal. BHARATH: I was signed out this pt pending CT imaging which shows no acute findings but has chronic issues with opacification left ethmoid sinus which may be causing pt issues as she states pain left sided, behind eye. Otherwise no other actionable treatment at this time. Pt and  made aware of findings. Pt states she had sinus surgery as a child in [place] for unknown reasons possible polyp. At this time will discharge home with meds for sinus congestion and will have pt f/u with ENT outpt.

## 2023-03-02 NOTE — ED ADULT TRIAGE NOTE - CHIEF COMPLAINT QUOTE
pt c/o headache and nausea x 4 days. reports pain from the back of the head, left eye pain and burning in the nose beginning today. no blurry vision, dizziness. no neuro deficits noted. pt in no distress. no PMH

## 2023-03-02 NOTE — ED PROVIDER NOTE - ATTENDING CONTRIBUTION TO CARE
Attending Statement: I have personally seen and examined this patient. I have fully participated in the care of this patient. I have reviewed all pertinent clinical information, including history physical exam, plan and the Resident's note and agree except as noted    32-year-old female history of anemia presents with headache and nausea x4 days.  Describes the headache is on top of her head on the forehead going to the back and today is mostly located behind her left eye.  Is been a constant discomfort associated with nausea but no vomiting.  Denies any change in vision.  Denies any neck pain.  Denies any numbness or weakness.  No falls no trauma.  Not on anticoagulation.  Denies history of headaches or migraines.  No chest pain no shortness of breath no cough no abdominal pain no urinary complaints and denies pregnancy.  Vital signs noted within normal.  Patient sitting up ANO x3 in the hallway no photo phobia supple neck no meningeal signs.  Extraocular motor intact no facial asymmetry no slurred speech moving all extremities without difficulty no work of breathing  Plan IV fluids, pain meds for headache, pregnancy test, CT head patient never never had imaging and and does not have history of headaches/migraines plan discussed with patient.  Will sign out pending imaging.  On reassessment

## 2023-03-02 NOTE — ED PROVIDER NOTE - CLINICAL SUMMARY MEDICAL DECISION MAKING FREE TEXT BOX
Found pt trying to get oob, pulled condom cath off, bed alarm on but not alarmong.  Camera placed in room Cally Arora MD, PGY-3: 33YO F hx anemia p/w headache, located on forehead, posterior to the L eye, a/w nausea. vss, pe normal neuro exam. suspect migraine, do not suspect meningitis given supple neck, no fevers. do not consider acute ocular pathology given no vision deficits, flashes/floaters of light. plan for migraine cocktail, upreg, reassess. Cally Arora MD, PGY-3: 33YO F hx anemia p/w headache, located on forehead, posterior to the L eye, a/w nausea. vss, pe normal neuro exam. suspect migraine, do not suspect meningitis given supple neck, no fevers. do not consider acute ocular pathology given no vision deficits, flashes/floaters of light. consider brain mass given no prior hx h/a. plan for migraine cocktail, upreg, ct head, reassess.

## 2023-03-02 NOTE — ED PROVIDER NOTE - PHYSICAL EXAMINATION
Gen: WDWN, NAD  HEENT: EOMI, no nasal discharge, mucous membranes moist, pupils 3mm b/l, reactive to light. no conjunctival injection b/l. neck supple, full ROM.   CV: RRR, 2+ radial pulses R  Resp:  no accessory muscle use, no increased work of breathing  GI: Abdomen soft non-distended, NTTP  MSK: No open wounds, no bruising, no LE edema  Neuro: A&Ox4, following commands, moving all four extremities spontaneously, CN3-12 intact, EOMI. PERRLA, 5/5 strength in b/l UE/LE.  Sensation intact bl in UE/LE.   Psych: appropriate mood

## 2023-03-03 LAB
FLUAV AG NPH QL: SIGNIFICANT CHANGE UP
FLUBV AG NPH QL: SIGNIFICANT CHANGE UP
RSV RNA NPH QL NAA+NON-PROBE: SIGNIFICANT CHANGE UP
SARS-COV-2 RNA SPEC QL NAA+PROBE: SIGNIFICANT CHANGE UP

## 2023-03-03 PROCEDURE — 70450 CT HEAD/BRAIN W/O DYE: CPT | Mod: 26,MG

## 2023-03-03 PROCEDURE — G1004: CPT

## 2023-03-03 RX ORDER — ACETAMINOPHEN 500 MG
2 TABLET ORAL
Qty: 24 | Refills: 0
Start: 2023-03-03 | End: 2023-03-06

## 2023-03-03 RX ORDER — PSEUDOEPHEDRINE HCL 30 MG
1 TABLET ORAL
Qty: 8 | Refills: 0
Start: 2023-03-03 | End: 2023-03-06

## 2023-03-03 RX ORDER — KETOROLAC TROMETHAMINE 30 MG/ML
15 SYRINGE (ML) INJECTION ONCE
Refills: 0 | Status: DISCONTINUED | OUTPATIENT
Start: 2023-03-03 | End: 2023-03-03

## 2023-03-03 RX ADMIN — Medication 975 MILLIGRAM(S): at 01:20

## 2023-03-03 NOTE — ED ADULT NURSE NOTE - OBJECTIVE STATEMENT
Abdominal pain, nausea, vomiting for three days, denies fever, vomited several times today. LMP was 01/02 and thinks that she may be pregnant. PMH of Asthma.  PT A&OX4.  No distress noted.  Denies CP, no SOB noted.  Breathing non-labored.  PT ambulatory, able to move all extremities.  COVID swab sent.  Left IVL 20G in place and tolerating well.  IVF in place.  Meds given as per MD order and tolerated well.  Will continue to monitor.

## 2023-11-15 NOTE — PACU DISCHARGE NOTE - NAUSEA/VOMITING:
Per orders of , injection of Typhoid given by Rhonda Mcduffie RN. Prior to immunization administration, verified patients identity using patient s name and date of birth. Patient instructed to remain in clinic for 15 minutes afterwards, and to report any adverse reaction to me or clinic staff immediately.    Rhonda Mcduffie RN on 11/15/2023 at 4:01 PM.    Please see Immunization Activity for additional information.      None
